# Patient Record
(demographics unavailable — no encounter records)

---

## 2025-02-27 NOTE — DISCUSSION/SUMMARY
[FreeTextEntry1] : I reviewed his CT scan and chest x-ray.  There is minimal basilar atelectasis.  I reassured him that the ribs would heal and it should not affect his lung function.

## 2025-02-27 NOTE — PHYSICAL EXAM
[No Acute Distress] : no acute distress [Normal Appearance] : normal appearance [Normal Rate/Rhythm] : normal rate/rhythm [Normal S1, S2] : normal s1, s2 [No Resp Distress] : no resp distress [Clear to Auscultation Bilaterally] : clear to auscultation bilaterally [Normal Gait] : normal gait [No Focal Deficits] : no focal deficits [Oriented x3] : oriented x3 [TextBox_68] : Occasional rhonchus in the right lower chest

## 2025-02-27 NOTE — HISTORY OF PRESENT ILLNESS
[TextBox_4] : 48-year-old male who has influenza in the middle of January developed severe coughing episode and as a result had a fracture displacement of 2 ribs, right sixth and seventh rib.  For a time he had some difficulty breathing but that is improving.  He had his bone density assessed and he has some mild osteoporosis.  He currently is on supplements.  He was concerned that it might affect his lung function. I had seen him previously and his lung functions at that time demonstrated low normal lung volumes but normal flow rates

## 2025-02-28 NOTE — HISTORY OF PRESENT ILLNESS
[de-identified] : 47 yo presents s/p displaced right posterior sixth and posterolateral seventh rib on 1/22/25 from flu cough. Patient did not see an orthopedist since and wanted to follow up. Reports pain he still feels pain in the area when he coughs/sneezes. Pt was dc'd on oxycodone, but is no longer taking anything for pain. Has difficulty sleeping on his right side, stretching to reach his arm as it reproduces pain.  Only improves in certain positions (sitting with straight posture).

## 2025-02-28 NOTE — DISCUSSION/SUMMARY
[de-identified] : Discussed findings of today's exam and possible causes of patient's pain.  Educated patient on their most probable diagnosis of right sixth and seventh rib fractures clear recent influenza infection/hospitalization but, patient now has mild tenderness of the bone and some persisting right sided costochondritis.  Reviewed possible courses of treatment, and we collaboratively decided best course of treatment at this time will include conservative management.  Patient appears to have routine healing of his fractures based on clinical exam, fractures were not seen clearly on consistent x-rays, they really picked up on insistent CT scan.  No specific need for repeat x-rays today as patient is showing signs of excellent clinical healing and we have a referral service 1 month after initial onset.  Patient is advised fractures can take upwards of 2 - 3 months to fully resolve.  Recommend continued watchful waiting of this issue.  Follow up as needed.  Patient appreciates and agrees with current plan.  This note was generated using dragon medical dictation software.  A reasonable effort has been made for proofreading its contents, but typos may still remain.  If there are any questions or points of clarification needed please notify my office.

## 2025-02-28 NOTE — PHYSICAL EXAM
[de-identified] : Constitutional: Well-nourished, well-developed, No acute distress Respiratory:  Good respiratory effort, no SOB Lymphatic: No regional lymphadenopathy, no lymphedema Psychiatric: Pleasant and normal affect, alert and oriented x3 Musculoskeletal: normal except where as noted in regional exam  Rib cage:  POSITIVE TENDERNESS: + TTP of the right 6-7 costochondral junctions, + tenderness of the posterior sixth rib, no other bony rib tenderness  NONTENDER:  No tenderness of all other ribs on the right, nontender left sided ribs 1-12 along the anterior, body and posterior portions b/l, no bony tenderness of the thoracic spine, no facet tenderness, no tenderness of xiphoid, sternum or manubrium, no clavicular, SC or AC joint tenderness b/l.  no tenderness of the diaphragm with deep palpation  Thoracic Spine Exam:  normal curvature and normal alignment. good posture. no midline bony tenderness, no marked spasm. no marked tenderness in paraspinal muscles.  ROM full & painless all planes